# Patient Record
Sex: FEMALE | Race: WHITE | NOT HISPANIC OR LATINO | Employment: UNEMPLOYED | URBAN - METROPOLITAN AREA
[De-identification: names, ages, dates, MRNs, and addresses within clinical notes are randomized per-mention and may not be internally consistent; named-entity substitution may affect disease eponyms.]

---

## 2017-06-22 PROBLEM — M81.0 SENILE OSTEOPOROSIS: Status: ACTIVE | Noted: 2017-06-22

## 2017-06-23 ENCOUNTER — HOSPITAL ENCOUNTER (OUTPATIENT)
Dept: INFUSION THERAPY | Facility: HOSPITAL | Age: 57
Discharge: HOME | End: 2017-06-23

## 2017-07-03 ENCOUNTER — HOSPITAL ENCOUNTER (OUTPATIENT)
Dept: INFUSION THERAPY | Facility: HOSPITAL | Age: 57
Discharge: HOME | End: 2017-07-03
Admitting: INTERNAL MEDICINE

## 2017-07-03 VITALS
WEIGHT: 145 LBS | HEIGHT: 65 IN | RESPIRATION RATE: 16 BRPM | SYSTOLIC BLOOD PRESSURE: 122 MMHG | BODY MASS INDEX: 24.16 KG/M2 | TEMPERATURE: 98.3 F | DIASTOLIC BLOOD PRESSURE: 77 MMHG | OXYGEN SATURATION: 100 % | HEART RATE: 94 BPM

## 2017-07-03 DIAGNOSIS — M81.0 SENILE OSTEOPOROSIS: ICD-10-CM

## 2017-07-03 LAB
ANION GAP SERPL CALCULATED.3IONS-SCNC: 15.1 MMOL/L
BUN BLD-MCNC: 12 MG/DL (ref 6–20)
BUN/CREAT SERPL: 16.4 (ref 7–25)
CALCIUM SPEC-SCNC: 9.8 MG/DL (ref 8.6–10.5)
CHLORIDE SERPL-SCNC: 102 MMOL/L (ref 98–107)
CO2 SERPL-SCNC: 25.9 MMOL/L (ref 22–29)
CREAT BLD-MCNC: 0.73 MG/DL (ref 0.57–1)
GFR SERPL CREATININE-BSD FRML MDRD: 82 ML/MIN/1.73
GLUCOSE BLD-MCNC: 91 MG/DL (ref 65–99)
POTASSIUM BLD-SCNC: 4.2 MMOL/L (ref 3.5–5.2)
SODIUM BLD-SCNC: 143 MMOL/L (ref 136–145)

## 2017-07-03 PROCEDURE — 96401 CHEMO ANTI-NEOPL SQ/IM: CPT

## 2017-07-03 PROCEDURE — 36415 COLL VENOUS BLD VENIPUNCTURE: CPT

## 2017-07-03 PROCEDURE — 25010000002 DENOSUMAB 60 MG/ML SOLUTION: Performed by: INTERNAL MEDICINE

## 2017-07-03 PROCEDURE — 80048 BASIC METABOLIC PNL TOTAL CA: CPT | Performed by: INTERNAL MEDICINE

## 2017-07-03 RX ADMIN — DENOSUMAB 60 MG: 60 INJECTION SUBCUTANEOUS at 11:23

## 2017-07-03 NOTE — PROGRESS NOTES
Calcium level WNL. Patient tolerated injection without complaints. Patient ambulatory, D/C'd from [facility] at 1128.

## 2017-07-03 NOTE — PATIENT INSTRUCTIONS
Denosumab injection  What is this medicine?  DENOSUMAB (den oh yasir mab) slows bone breakdown. Prolia is used to treat osteoporosis in women after menopause and in men. Xgeva is used to prevent bone fractures and other bone problems caused by cancer bone metastases. Xgeva is also used to treat giant cell tumor of the bone.  This medicine may be used for other purposes; ask your health care provider or pharmacist if you have questions.  COMMON BRAND NAME(S): Prolia, XGEVA  What should I tell my health care provider before I take this medicine?  They need to know if you have any of these conditions:  -dental disease  -eczema  -infection or history of infections  -kidney disease or on dialysis  -low blood calcium or vitamin D  -malabsorption syndrome  -scheduled to have surgery or tooth extraction  -taking medicine that contains denosumab  -thyroid or parathyroid disease  -an unusual reaction to denosumab, other medicines, foods, dyes, or preservatives  -pregnant or trying to get pregnant  -breast-feeding  How should I use this medicine?  This medicine is for injection under the skin. It is given by a health care professional in a hospital or clinic setting.  If you are getting Prolia, a special MedGuide will be given to you by the pharmacist with each prescription and refill. Be sure to read this information carefully each time.  For Prolia, talk to your pediatrician regarding the use of this medicine in children. Special care may be needed. For Xgeva, talk to your pediatrician regarding the use of this medicine in children. While this drug may be prescribed for children as young as 13 years for selected conditions, precautions do apply.  Overdosage: If you think you have taken too much of this medicine contact a poison control center or emergency room at once.  NOTE: This medicine is only for you. Do not share this medicine with others.  What if I miss a dose?  It is important not to miss your dose. Call your doctor  or health care professional if you are unable to keep an appointment.  What may interact with this medicine?  Do not take this medicine with any of the following medications:  -other medicines containing denosumab  This medicine may also interact with the following medications:  -medicines that suppress the immune system  -medicines that treat cancer  -steroid medicines like prednisone or cortisone  This list may not describe all possible interactions. Give your health care provider a list of all the medicines, herbs, non-prescription drugs, or dietary supplements you use. Also tell them if you smoke, drink alcohol, or use illegal drugs. Some items may interact with your medicine.  What should I watch for while using this medicine?  Visit your doctor or health care professional for regular checks on your progress. Your doctor or health care professional may order blood tests and other tests to see how you are doing.  Call your doctor or health care professional if you get a cold or other infection while receiving this medicine. Do not treat yourself. This medicine may decrease your body's ability to fight infection.  You should make sure you get enough calcium and vitamin D while you are taking this medicine, unless your doctor tells you not to. Discuss the foods you eat and the vitamins you take with your health care professional.  See your dentist regularly. Brush and floss your teeth as directed. Before you have any dental work done, tell your dentist you are receiving this medicine.  Do not become pregnant while taking this medicine or for 5 months after stopping it. Women should inform their doctor if they wish to become pregnant or think they might be pregnant. There is a potential for serious side effects to an unborn child. Talk to your health care professional or pharmacist for more information.  What side effects may I notice from receiving this medicine?  Side effects that you should report to your doctor  or health care professional as soon as possible:  -allergic reactions like skin rash, itching or hives, swelling of the face, lips, or tongue  -breathing problems  -chest pain  -fast, irregular heartbeat  -feeling faint or lightheaded, falls  -fever, chills, or any other sign of infection  -muscle spasms, tightening, or twitches  -numbness or tingling  -skin blisters or bumps, or is dry, peels, or red  -slow healing or unexplained pain in the mouth or jaw  -unusual bleeding or bruising  Side effects that usually do not require medical attention (report to your doctor or health care professional if they continue or are bothersome):  -muscle pain  -stomach upset, gas  This list may not describe all possible side effects. Call your doctor for medical advice about side effects. You may report side effects to FDA at 9-191-FDA-2241.  Where should I keep my medicine?  This medicine is only given in a clinic, doctor's office, or other health care setting and will not be stored at home.  NOTE: This sheet is a summary. It may not cover all possible information. If you have questions about this medicine, talk to your doctor, pharmacist, or health care provider.     © 2017, [employer]. (2017-01-19 10:06:55)

## 2017-11-14 ENCOUNTER — LAB (OUTPATIENT)
Dept: OTHER | Facility: HOSPITAL | Age: 57
End: 2017-11-14

## 2017-11-14 ENCOUNTER — CONSULT (OUTPATIENT)
Dept: ONCOLOGY | Facility: CLINIC | Age: 57
End: 2017-11-14

## 2017-11-14 VITALS
BODY MASS INDEX: 24.41 KG/M2 | HEIGHT: 64 IN | HEART RATE: 99 BPM | WEIGHT: 143 LBS | RESPIRATION RATE: 18 BRPM | SYSTOLIC BLOOD PRESSURE: 104 MMHG | DIASTOLIC BLOOD PRESSURE: 70 MMHG | TEMPERATURE: 98.4 F | OXYGEN SATURATION: 99 %

## 2017-11-14 DIAGNOSIS — R63.4 WEIGHT LOSS, ABNORMAL: ICD-10-CM

## 2017-11-14 DIAGNOSIS — R10.12 LEFT UPPER QUADRANT PAIN: ICD-10-CM

## 2017-11-14 DIAGNOSIS — R63.0 ANOREXIA: ICD-10-CM

## 2017-11-14 DIAGNOSIS — R61 NIGHT SWEAT: Primary | ICD-10-CM

## 2017-11-14 DIAGNOSIS — R59.1 LYMPHADENOPATHY OF HEAD AND NECK: ICD-10-CM

## 2017-11-14 DIAGNOSIS — D72.820 LYMPHOCYTOSIS: ICD-10-CM

## 2017-11-14 DIAGNOSIS — R89.9 ABNORMAL LABORATORY TEST: Primary | ICD-10-CM

## 2017-11-14 LAB
ALBUMIN SERPL-MCNC: 4.5 G/DL (ref 3.5–5.2)
ALBUMIN/GLOB SERPL: 1.3 G/DL
ALP SERPL-CCNC: 82 U/L (ref 39–117)
ALT SERPL W P-5'-P-CCNC: 13 U/L (ref 1–33)
ANION GAP SERPL CALCULATED.3IONS-SCNC: 16.1 MMOL/L
AST SERPL-CCNC: 22 U/L (ref 1–32)
BASOPHILS # BLD AUTO: 0.07 10*3/MM3 (ref 0–0.2)
BASOPHILS NFR BLD AUTO: 0.5 % (ref 0–1.5)
BILIRUB SERPL-MCNC: 0.2 MG/DL (ref 0.1–1.2)
BUN BLD-MCNC: 11 MG/DL (ref 6–20)
BUN/CREAT SERPL: 12 (ref 7–25)
CALCIUM SPEC-SCNC: 9.7 MG/DL (ref 8.6–10.5)
CHLORIDE SERPL-SCNC: 99 MMOL/L (ref 98–107)
CO2 SERPL-SCNC: 23.9 MMOL/L (ref 22–29)
CREAT BLD-MCNC: 0.92 MG/DL (ref 0.57–1)
CRP SERPL-MCNC: 0.07 MG/DL (ref 0–0.5)
DEPRECATED RDW RBC AUTO: 44 FL (ref 37–54)
EOSINOPHIL # BLD AUTO: 0.13 10*3/MM3 (ref 0–0.7)
EOSINOPHIL NFR BLD AUTO: 0.9 % (ref 0.3–6.2)
ERYTHROCYTE [DISTWIDTH] IN BLOOD BY AUTOMATED COUNT: 13 % (ref 11.7–13)
ERYTHROCYTE [SEDIMENTATION RATE] IN BLOOD: 14 MM/HR (ref 0–30)
GFR SERPL CREATININE-BSD FRML MDRD: 63 ML/MIN/1.73
GLOBULIN UR ELPH-MCNC: 3.5 GM/DL
GLUCOSE BLD-MCNC: 88 MG/DL (ref 65–99)
HCT VFR BLD AUTO: 41.7 % (ref 35.6–45.5)
HGB BLD-MCNC: 14.2 G/DL (ref 11.9–15.5)
IMM GRANULOCYTES # BLD: 0.08 10*3/MM3 (ref 0–0.03)
IMM GRANULOCYTES NFR BLD: 0.6 % (ref 0–0.5)
LDH SERPL-CCNC: 231 U/L (ref 135–214)
LYMPHOCYTES # BLD AUTO: 5.86 10*3/MM3 (ref 0.9–4.8)
LYMPHOCYTES NFR BLD AUTO: 40.8 % (ref 19.6–45.3)
MCH RBC QN AUTO: 31.2 PG (ref 26.9–32)
MCHC RBC AUTO-ENTMCNC: 34.1 G/DL (ref 32.4–36.3)
MCV RBC AUTO: 91.6 FL (ref 80.5–98.2)
MONOCYTES # BLD AUTO: 1.26 10*3/MM3 (ref 0.2–1.2)
MONOCYTES NFR BLD AUTO: 8.8 % (ref 5–12)
NEUTROPHILS # BLD AUTO: 6.98 10*3/MM3 (ref 1.9–8.1)
NEUTROPHILS NFR BLD AUTO: 48.4 % (ref 42.7–76)
NRBC BLD MANUAL-RTO: 0.2 /100 WBC (ref 0–0)
PLATELET # BLD AUTO: 333 10*3/MM3 (ref 140–500)
PMV BLD AUTO: 10 FL (ref 6–12)
POTASSIUM BLD-SCNC: 4.1 MMOL/L (ref 3.5–5.2)
PROT SERPL-MCNC: 8 G/DL (ref 6–8.5)
RBC # BLD AUTO: 4.55 10*6/MM3 (ref 3.9–5.2)
SODIUM BLD-SCNC: 139 MMOL/L (ref 136–145)
WBC NRBC COR # BLD: 14.38 10*3/MM3 (ref 4.5–10.7)

## 2017-11-14 PROCEDURE — 84165 PROTEIN E-PHORESIS SERUM: CPT | Performed by: INTERNAL MEDICINE

## 2017-11-14 PROCEDURE — 86334 IMMUNOFIX E-PHORESIS SERUM: CPT | Performed by: INTERNAL MEDICINE

## 2017-11-14 PROCEDURE — 80053 COMPREHEN METABOLIC PANEL: CPT | Performed by: INTERNAL MEDICINE

## 2017-11-14 PROCEDURE — 85025 COMPLETE CBC W/AUTO DIFF WBC: CPT | Performed by: INTERNAL MEDICINE

## 2017-11-14 PROCEDURE — 83883 ASSAY NEPHELOMETRY NOT SPEC: CPT | Performed by: INTERNAL MEDICINE

## 2017-11-14 PROCEDURE — 83615 LACTATE (LD) (LDH) ENZYME: CPT | Performed by: INTERNAL MEDICINE

## 2017-11-14 PROCEDURE — 36415 COLL VENOUS BLD VENIPUNCTURE: CPT

## 2017-11-14 PROCEDURE — 86140 C-REACTIVE PROTEIN: CPT | Performed by: INTERNAL MEDICINE

## 2017-11-14 PROCEDURE — 99204 OFFICE O/P NEW MOD 45 MIN: CPT | Performed by: INTERNAL MEDICINE

## 2017-11-14 PROCEDURE — 85651 RBC SED RATE NONAUTOMATED: CPT | Performed by: INTERNAL MEDICINE

## 2017-11-14 RX ORDER — FLUTICASONE PROPIONATE 50 MCG
2 SPRAY, SUSPENSION (ML) NASAL DAILY
COMMUNITY

## 2017-11-14 RX ORDER — ATORVASTATIN CALCIUM 10 MG/1
10 TABLET, FILM COATED ORAL NIGHTLY
COMMUNITY

## 2017-11-14 RX ORDER — ALBUTEROL SULFATE 90 UG/1
2 AEROSOL, METERED RESPIRATORY (INHALATION) EVERY 4 HOURS PRN
COMMUNITY

## 2017-11-14 NOTE — PROGRESS NOTES
Subjective     REASON FOR CONSULTATION:  LYMPHOCYTOSIS, LEUKOCYTOSIS, ANOREXIA, WEIGHT  LOSS 30 LB IN 3 MONTHS, NOCUTURNAL SWEATS, ABDOMINAL PAIN  Provide an opinion on any further workup or treatment                             REQUESTING PHYSICIAN:  DR CLARA ANN PALLARES MD    RECORDS OBTAINED:  Records of the patients history including those obtained from the referring provider were reviewed and summarized in detail.    HISTORY OF PRESENT ILLNESS:  The patient is a 57 y.o. year old female who is here for an opinion about the above issue.    History of Present Illness This 57-year-old white female has been referred to us in order to review history of lymphocytosis that has been ongoing at least for 1-1/2 years. She has fluctuating white count in the 14,000, 25839 and then down to 10,000. She has had a normal hemoglobin and a normal platelet count and persistent lymphocytosis. On top of this the patient during the last 3 months has developed significant anorexia, losing 30 pounds of weight and having nocturnal sweats to the point that she has to get out of the bed and change her pajamas. She takes Tylenol t.i.d. She is not sure if she has had any fever or not. The patient feels very fatigued and tired to the point that she needs to lie down in bed and take a nap at 3 times a week. This is new to her. Besides this the patient has been experiencing intermittently a pain in the left upper quadrant of her abdomen, intensity 6 out of 10, propagated to the umbilical area, not associated with defecation or urination, not triggered by nausea, vomiting and no other concomitant. The pain comes and goes; sometimes wakes her up in the middle of the night and is not relieved with anything besides Tylenol.     The patient denies any palpable adenopathy at any level. She complains of a macular rash that comes and goes in the forearms intermittently. She has also an element of pruritus. She has not noticed any other alteration  in the skin. She denies any headache, blurred vision, alterations in the nasal passages or oral mucosa. She has no difficulty swallowing. She has chronic hacking cough associated with mucoid sputum production and related to chronic smoking. She has shortness of breath upon exercise due to COPD. No paroxysmal nocturnal dyspnea. No orthopnea. She has not had any hemoptysis. She has not had any difficulty swallowing. No nausea, no vomiting, but occasional nausea associated after her hiatal hernia surgery. The patient's bowel activity remains normal. She has no obvious abdominal distention. She has not had passage of blood in the stool and she has not developed any jaundice. She has not had any urinary symptomatology, vaginal bleeding or vaginal discharge. She has aches and pains in the neck and multiple other sites that are chronic in nature, related to previous trauma and osteoarthritis. These pains have not changed. She has not developed any other areas of acute pain besides what I stated in her abdomen. The patient denies any other rashes. She has a headache, dull in nature, comes and goes with no specific concomitant factor.  She denies any photophobia. Again no fevers, no chills.        Past Medical History:   Diagnosis Date   • COPD (chronic obstructive pulmonary disease)    • GERD (gastroesophageal reflux disease)    • H/O Anesthesia complication     Has awakened during scope procedures   • H/O B12 deficiency    • H/O Diverticulosis    • H/O Neck pain    • H/O PONV (postoperative nausea and vomiting)     Does well with scope patch   • H/O Syncope and collapse 2013    Cardiac workup w/o cardiac disease diagnosed   • Hiatal hernia    • IBS (irritable bowel syndrome)    • Osteoporosis    • Pulmonary embolism 1986    Post hysterectomy        Past Surgical History:   Procedure Laterality Date   • APPENDECTOMY     • CHOLECYSTECTOMY     • HERNIA REPAIR     • HYSTERECTOMY  1986    partial   • NECK SURGERY     •  OOPHORECTOMY Right    • SHOULDER SURGERY Right 2015    REMOVE BONE SPURS   • TONSILLECTOMY          Current Outpatient Prescriptions on File Prior to Visit   Medication Sig Dispense Refill   • acetaminophen (TYLENOL) 500 MG tablet Take 500 mg by mouth 2 (Two) Times a Day. 2,  bid     • cyanocobalamin 1000 MCG/ML injection Inject 1,000 mcg into the shoulder, thigh, or buttocks Every 30 (Thirty) Days.     • denosumab (PROLIA) 60 MG/ML solution syringe Inject  under the skin Every 6 (Six) Months.     • furosemide (LASIX) 20 MG tablet Take 20 mg by mouth daily.     • gabapentin (NEURONTIN) 300 MG capsule Take 600 mg by mouth 3 (Three) Times a Day.     • hydrOXYzine (ATARAX) 25 MG tablet Take 50 mg by mouth every night.     • [DISCONTINUED] albuterol (PROVENTIL HFA;VENTOLIN HFA) 108 (90 BASE) MCG/ACT inhaler Inhale 2 puffs as needed for wheezing.     • [DISCONTINUED] aspirin 81 MG tablet Take 81 mg by mouth Daily.       No current facility-administered medications on file prior to visit.         ALLERGIES:    Allergies   Allergen Reactions   • Codeine    • Ibuprofen         Social History     Social History   • Marital status:      Spouse name: Sunday   • Number of children: N/A   • Years of education: N/A     Occupational History   •  Unemployed     Social History Main Topics   • Smoking status: Current Every Day Smoker     Packs/day: 0.50     Types: Cigarettes   • Smokeless tobacco: None      Comment: 35 years     1/2 ppd   • Alcohol use No   • Drug use: No   • Sexual activity: Defer     Other Topics Concern   • None     Social History Narrative        Family History   Problem Relation Age of Onset   • Hypertension Mother    • Throat cancer Father      Voice box   • Heart attack Father    • Heart disease Father    • Colon polyps Sister    • Colon cancer Paternal Aunt         Review of Systems   General: no fever, chills,STATED fatigue,AND weight changes, AND lack of appetite.  Eyes: no epiphora,  "xerophthalmia,conjunctivitis, pain, glaucoma, blurred vision, blindness, secretion, photophobia, proptosis, diplopia.  Ears: no otorrhea, tinnitus, otorrhagia, deafness, pain, vertigo.  Nose: no rhinorrhea, epistaxis, alteration in perception of odors, sinuses pressure.  Mouth: no alteration in gums or teeth,  ulcers, no difficulty with mastication or deglut ion, no odynophagia.  Neck: no masses or pain, no thyroid alterations, no pain in muscles or arteries, no carotid odynia, no crepitation.  Respiratory: STATED cough,CLEAR sputum production, dyspnea,NO trepopnea, pleuritic pain, hemoptysis.  Heart: no syncope, irregularity, palpitations, angina, orthopnea, paroxysmal nocturnal dyspnea.  Vascular Venous: no tenderness,edema, palpable cords, postphlebitic syndrome, skin changes or ulcerations.  Vascular Arterial: no distal ischemia, claudication, gangrene, neuropathic ischemic pain, skin ulcers, paleness or cyanosis.  GI: no dysphagia, odynophagia, no regurgitation, no heartburn,no indigestion,no nausea,no vomiting,no hematemesis ,no melena,no jaundice,no distention, no obstipation,no enterorrhagia,no proctalgia,no anal  lesions, nochanges in bowel habits.STATED ABDOMINAL PAIN.  : no frequency, hesitancy, hematuria, discharge, pain.  Musculoskeletal: STATED muscle or tendon pain or inflammation, joint pain, edema, functional limitation, fasciculations, mass.  Neurologic: no headache, seizures, alterations on Craneal nerves, no motor or senssory deficit, normal coordination, no alteration in memory, orientation, calculation,writting, verbal or written language.  Skin: STATED rashes, AND pruritus NO localized lesions.  Psychiatric: no anxiety, depression, agitation, delusions, proper insight.    Objective     Vitals:    11/14/17 1412   BP: 104/70   Pulse: 99   Resp: 18   Temp: 98.4 °F (36.9 °C)   TempSrc: Oral   SpO2: 99%   Weight: 143 lb (64.9 kg)   Height: 64.17\" (163 cm)   PainSc: 6  Comment: generalized joint " muslce pain   PainLoc: Generalized     Current Status 11/14/2017   ECOG score 0       Physical Exam    GENERAL:  Well-developed, well-nourished  Patient  in no acute distress. LOOKING OLDER FOR HER CHRONOLOGICAL AGE.  SKIN:  Warm, dry without rashes, purpura or petechiae.  HEENT:  Pupils were equal and reactive to light and accomodation, conjunctivas non injected, no pterigion, normal extraocular movements, normal visual acuity. BILATERAL ARCUS SENILIS AND CATARACTS.  Mouth mucosa was moist, no exudates in oropharynx, normal gum line, normal roof of the mouth and pillars, normal papillations of the tongue.Ear canals were normal, as well tympanic membranes, normal hearing acuity.No pain in mastoid area or erythema.  NECK:  Supple with good range of motion; no thyromegaly or masses, no JVD or bruits, 1 CM BILATERAL JUGULODIGASTRIC ADENOPATHIES MOBILE NON TENDER RUBBERY.NO OTHER cervical adenopathies.No carotid arteries pain, no carotid abnormal pulsation or arterial dance.SCAR FROM PREVIOUS NECK SURGERY  LYMPHATICS:  No supraclavicular, axillary, epitrochlear or inguinal adenopathy.  CHEST:  Normal excursion of both randy thoraces, normal voice fremitus, no subcutaneous emphysema, normal axillas, no rashes or acanthosis nigricans. Lungs clear to percussion and auscultation, normal breath sounds bilaterally, no wheezing, crackles or ronchi, no stridor, no rubs.  CARDIAC AND VASCULAR: PMI not displaced,no thrills, normal rate and regular rhythm, without murmurs, rubs or S3 or S4 right or left sided gallops. Normal femoral, popliteal, pedis, brachial and carotid pulses.  ABDOMEN:  Soft, LUQ tender, NO REBOUND with no organomegaly or masses, no ascites, no collateral circulation,no distention,no Mermentau sign,  no inguinal hernias,no umbilical hernias, no abdominal bruits. Normal bowel sounds.  GENITAL: Not  Performed.  EXTREMITIES  AND SPINE:  No clubbing, cyanosis or edema, no deformities or pain .No kyphosis, scoliosis,  deformities or pain in spine, ribs or pelvic bone.PAIN IN NECK AREA AND LOWER LUMBAR SPINE  NEUROLOGICAL:  Patient was awake, alert, oriented to time, person and place,normal gait and coordination  RECENT LABS:  Hematology WBC   Date Value Ref Range Status   2017 14.38 (H) 4.50 - 10.70 10*3/mm3 Final     RBC   Date Value Ref Range Status   2017 4.55 3.90 - 5.20 10*6/mm3 Final     Hemoglobin   Date Value Ref Range Status   2017 14.2 11.9 - 15.5 g/dL Final     Hematocrit   Date Value Ref Range Status   2017 41.7 35.6 - 45.5 % Final     Platelets   Date Value Ref Range Status   2017 333 140 - 500 10*3/mm3 Final            Assessment/Plan  In summary I have a 57-year-old white female who is a chronic smoker who has been on disability due to chronic back pain, cervical spine pain, who has been referred to us because she has had a leukocytosis at least since 2016 and progressive lymphocytosis. During the last 2 months the patient has started to complain of pain in the left upper quadrant of her abdomen intermittently on daily basis. She has lost her appetite and she has lost 30 pounds of weight. She has nocturnal sweats and significant degree of fatigue to the point that she needs to take a nap through the day and this is not her. Her physical exam is unrevealing with the exception of palpable cervical adenopathies stated in the physical examination and persistent lymphocytosis and leukocytosis. The abdominal exam is benign, at least no palpable organomegaly or masses were documented. The patient mentions a rash in the forearms but she has none today though. She is a chronic smoker. All this concerns me in regard to the possibility of occult malignancy of some sort or also the possibility of a lymphatic malignancy like some sort of leukemia or lymphoma. For this reason I am going to advise the patient the followin. I have not prescribed or modified any of her medicines.   2. The  patient will proceed with a chemistry profile, LDH, C-reactive protein, serum protein immunoelectrophoresis and peripheral blood flow cytometry to further try to identify the lymphocytes.   3. The patient will have peripheral blood examination by me.   4. She will proceed with a CT scan of the neck, chest, abdomen and pelvis in a few days to further investigate the cervical adenopathies and the sweats, fatigue, the weight loss, and also the pain in the left lower quadrant of her abdomen.     After we do all this testing, the patient will return to see me, put all this information together and then decide how to proceed at that point. She was ready to proceed. We spent 1 hour of our time together, going through the review of all these issues and going through examination and final conclusions. She was ready to proceed.

## 2017-11-16 ENCOUNTER — HOSPITAL ENCOUNTER (OUTPATIENT)
Dept: CT IMAGING | Facility: HOSPITAL | Age: 57
Discharge: HOME | End: 2017-11-16
Attending: INTERNAL MEDICINE | Admitting: INTERNAL MEDICINE

## 2017-11-16 DIAGNOSIS — R63.4 WEIGHT LOSS, ABNORMAL: ICD-10-CM

## 2017-11-16 DIAGNOSIS — R10.12 LEFT UPPER QUADRANT PAIN: ICD-10-CM

## 2017-11-16 DIAGNOSIS — R61 NIGHT SWEAT: ICD-10-CM

## 2017-11-16 DIAGNOSIS — R63.0 ANOREXIA: ICD-10-CM

## 2017-11-16 DIAGNOSIS — D72.820 LYMPHOCYTOSIS: ICD-10-CM

## 2017-11-16 DIAGNOSIS — R59.1 LYMPHADENOPATHY OF HEAD AND NECK: ICD-10-CM

## 2017-11-16 LAB
ALBUMIN SERPL-MCNC: 4 G/DL (ref 2.9–4.4)
ALBUMIN/GLOB SERPL: 1.2 {RATIO} (ref 0.7–1.7)
ALPHA1 GLOB FLD ELPH-MCNC: 0.3 G/DL (ref 0–0.4)
ALPHA2 GLOB SERPL ELPH-MCNC: 0.9 G/DL (ref 0.4–1)
B-GLOBULIN SERPL ELPH-MCNC: 1.4 G/DL (ref 0.7–1.3)
GAMMA GLOB SERPL ELPH-MCNC: 1 G/DL (ref 0.4–1.8)
GLOBULIN SER CALC-MCNC: 3.6 G/DL (ref 2.2–3.9)
IGA SERPL-MCNC: 336 MG/DL (ref 87–352)
IGG SERPL-MCNC: 842 MG/DL (ref 700–1600)
IGM SERPL-MCNC: 140 MG/DL (ref 26–217)
INTERPRETATION SERPL IEP-IMP: ABNORMAL
KAPPA LC SERPL-MCNC: 29.7 MG/L (ref 3.3–19.4)
KAPPA LC/LAMBDA SER: 1.63 {RATIO} (ref 0.26–1.65)
LAMBDA LC FREE SERPL-MCNC: 18.2 MG/L (ref 5.7–26.3)
Lab: ABNORMAL
M-SPIKE: ABNORMAL G/DL
PROT SERPL-MCNC: 7.6 G/DL (ref 6–8.5)
REF LAB TEST METHOD: NORMAL

## 2017-11-16 PROCEDURE — 74177 CT ABD & PELVIS W/CONTRAST: CPT

## 2017-11-16 PROCEDURE — 71260 CT THORAX DX C+: CPT

## 2017-11-16 PROCEDURE — 82565 ASSAY OF CREATININE: CPT

## 2017-11-16 PROCEDURE — 0 IOPAMIDOL 61 % SOLUTION: Performed by: INTERNAL MEDICINE

## 2017-11-16 PROCEDURE — 70491 CT SOFT TISSUE NECK W/DYE: CPT

## 2017-11-16 PROCEDURE — 0 DIATRIZOATE MEGLUMINE & SODIUM PER 1 ML: Performed by: INTERNAL MEDICINE

## 2017-11-16 RX ADMIN — IOPAMIDOL 90 ML: 612 INJECTION, SOLUTION INTRAVENOUS at 13:10

## 2017-11-16 RX ADMIN — DIATRIZOATE MEGLUMINE AND DIATRIZOATE SODIUM 30 ML: 660; 100 LIQUID ORAL; RECTAL at 12:10

## 2017-11-17 LAB — CREAT BLDA-MCNC: 0.7 MG/DL (ref 0.6–1.3)

## 2017-12-05 ENCOUNTER — OFFICE VISIT (OUTPATIENT)
Dept: ONCOLOGY | Facility: CLINIC | Age: 57
End: 2017-12-05

## 2017-12-05 ENCOUNTER — LAB (OUTPATIENT)
Dept: OTHER | Facility: HOSPITAL | Age: 57
End: 2017-12-05

## 2017-12-05 VITALS
SYSTOLIC BLOOD PRESSURE: 105 MMHG | DIASTOLIC BLOOD PRESSURE: 67 MMHG | OXYGEN SATURATION: 98 % | HEART RATE: 93 BPM | BODY MASS INDEX: 24.75 KG/M2 | WEIGHT: 145 LBS | HEIGHT: 64 IN | RESPIRATION RATE: 16 BRPM | TEMPERATURE: 98 F

## 2017-12-05 DIAGNOSIS — D72.820 LYMPHOCYTOSIS: Primary | ICD-10-CM

## 2017-12-05 DIAGNOSIS — R63.4 WEIGHT LOSS, ABNORMAL: ICD-10-CM

## 2017-12-05 PROBLEM — K20.90 ESOPHAGITIS: Status: ACTIVE | Noted: 2017-12-05

## 2017-12-05 LAB
BASOPHILS # BLD AUTO: 0.09 10*3/MM3 (ref 0–0.2)
BASOPHILS NFR BLD AUTO: 0.8 % (ref 0–1.5)
DEPRECATED RDW RBC AUTO: 43.8 FL (ref 37–54)
EOSINOPHIL # BLD AUTO: 0.14 10*3/MM3 (ref 0–0.7)
EOSINOPHIL NFR BLD AUTO: 1.3 % (ref 0.3–6.2)
ERYTHROCYTE [DISTWIDTH] IN BLOOD BY AUTOMATED COUNT: 13.2 % (ref 11.7–13)
HCT VFR BLD AUTO: 37.2 % (ref 35.6–45.5)
HGB BLD-MCNC: 12.6 G/DL (ref 11.9–15.5)
IMM GRANULOCYTES # BLD: 0.02 10*3/MM3 (ref 0–0.03)
IMM GRANULOCYTES NFR BLD: 0.2 % (ref 0–0.5)
LYMPHOCYTES # BLD AUTO: 5.79 10*3/MM3 (ref 0.9–4.8)
LYMPHOCYTES NFR BLD AUTO: 52.6 % (ref 19.6–45.3)
MCH RBC QN AUTO: 31.1 PG (ref 26.9–32)
MCHC RBC AUTO-ENTMCNC: 33.9 G/DL (ref 32.4–36.3)
MCV RBC AUTO: 91.9 FL (ref 80.5–98.2)
MONOCYTES # BLD AUTO: 1 10*3/MM3 (ref 0.2–1.2)
MONOCYTES NFR BLD AUTO: 9.1 % (ref 5–12)
NEUTROPHILS # BLD AUTO: 3.97 10*3/MM3 (ref 1.9–8.1)
NEUTROPHILS NFR BLD AUTO: 36 % (ref 42.7–76)
NRBC BLD MANUAL-RTO: 0 /100 WBC (ref 0–0)
PLATELET # BLD AUTO: 273 10*3/MM3 (ref 140–500)
PMV BLD AUTO: 10.2 FL (ref 6–12)
RBC # BLD AUTO: 4.05 10*6/MM3 (ref 3.9–5.2)
WBC NRBC COR # BLD: 11.01 10*3/MM3 (ref 4.5–10.7)

## 2017-12-05 PROCEDURE — 99214 OFFICE O/P EST MOD 30 MIN: CPT | Performed by: INTERNAL MEDICINE

## 2017-12-05 PROCEDURE — 85025 COMPLETE CBC W/AUTO DIFF WBC: CPT | Performed by: INTERNAL MEDICINE

## 2017-12-05 PROCEDURE — 36415 COLL VENOUS BLD VENIPUNCTURE: CPT

## 2017-12-05 NOTE — PROGRESS NOTES
Subjective     REASON FOR FOLLOW UP:  LYMPHOCYTOSIS, LEUKOCYTOSIS, ANOREXIA, WEIGHT  LOSS 30 LB IN 3 MONTHS, NOCUTURNAL SWEATS, ABDOMINAL PAIN                               RHISTORY OF PRESENT ILLNESS:  The patient is a 57 y.o. year old female who is here for an opinion about the above issue.    History of Present Illness This 57-year-old white female has been referred to us in order to review history of lymphocytosis that has been ongoing at least for 1-1/2 years. She has fluctuating white count in the 14,000, 76209 and then down to 10,000. She has had a normal hemoglobin and a normal platelet count and persistent lymphocytosis. On top of this the patient during the last 3 months has developed significant anorexia, losing 30 pounds of weight and having nocturnal sweats to the point that she has to get out of the bed and change her pajamas. She takes Tylenol t.i.d. She is not sure if she has had any fever or not. The patient feels very fatigued and tired to the point that she needs to lie down in bed and take a nap at 3 times a week. This is new to her. Besides this the patient has been experiencing intermittently a pain in the left upper quadrant of her abdomen, intensity 6 out of 10, propagated to the umbilical area, not associated with defecation or urination, not triggered by nausea, vomiting and no other concomitant. The pain comes and goes; sometimes wakes her up in the middle of the night and is not relieved with anything besides Tylenol.     The patient denies any palpable adenopathy at any level. She complains of a macular rash that comes and goes in the forearms intermittently. She has also an element of pruritus. She has not noticed any other alteration in the skin. She denies any headache, blurred vision, alterations in the nasal passages or oral mucosa. She has no difficulty swallowing. She has chronic hacking cough associated with mucoid sputum production and related to chronic smoking. She has  shortness of breath upon exercise due to COPD. No paroxysmal nocturnal dyspnea. No orthopnea. She has not had any hemoptysis. She has not had any difficulty swallowing. No nausea, no vomiting, but occasional nausea associated after her hiatal hernia surgery. The patient's bowel activity remains normal. She has no obvious abdominal distention. She has not had passage of blood in the stool and she has not developed any jaundice. She has not had any urinary symptomatology, vaginal bleeding or vaginal discharge. She has aches and pains in the neck and multiple other sites that are chronic in nature, related to previous trauma and osteoarthritis. These pains have not changed. She has not developed any other areas of acute pain besides what I stated in her abdomen. The patient denies any other rashes. She has a headache, dull in nature, comes and goes with no specific concomitant factor.  She denies any photophobia. Again no fevers, no chills.  The patient was reviewed on 12/05/2017 still with similar symptomatology to the one described in the original consultation but not experiencing any fevers or chills. She was experiencing some discomfort throughout her abdomen but no abdominal distention or modification in bowel habits and neither passage of blood in the stool. She was not having any significant dysphagia. Please review in regard to the analysis done and the recommendations.          Past Medical History:   Diagnosis Date   • COPD (chronic obstructive pulmonary disease)    • GERD (gastroesophageal reflux disease)    • H/O Anesthesia complication     Has awakened during scope procedures   • H/O B12 deficiency    • H/O Diverticulosis    • H/O Neck pain    • H/O PONV (postoperative nausea and vomiting)     Does well with scope patch   • H/O Syncope and collapse 2013    Cardiac workup w/o cardiac disease diagnosed   • Hiatal hernia    • IBS (irritable bowel syndrome)    • Osteoporosis    • Pulmonary embolism 1986     Post hysterectomy        Past Surgical History:   Procedure Laterality Date   • APPENDECTOMY  1982   • CHOLECYSTECTOMY  1982   • HERNIA REPAIR  2016    Reflux and hiatal   • HYSTERECTOMY  1986    partial   • NECK SURGERY  2012    Plate, C5-C8   • OOPHORECTOMY Right 1990   • SHOULDER SURGERY Right 2015    REMOVE BONE SPURS   • TONSILLECTOMY     • TUMOR REMOVAL Right 2014    Lower back, fatty        Current Outpatient Prescriptions on File Prior to Visit   Medication Sig Dispense Refill   • acetaminophen (TYLENOL) 500 MG tablet Take 500 mg by mouth 2 (Two) Times a Day. 2,  bid     • albuterol (PROVENTIL HFA;VENTOLIN HFA) 108 (90 Base) MCG/ACT inhaler Inhale 2 puffs Every 4 (Four) Hours As Needed for Wheezing.     • atorvastatin (LIPITOR) 10 MG tablet Take 10 mg by mouth Every Night.     • cyanocobalamin 1000 MCG/ML injection Inject 1,000 mcg into the shoulder, thigh, or buttocks Every 30 (Thirty) Days.     • denosumab (PROLIA) 60 MG/ML solution syringe Inject  under the skin Every 6 (Six) Months.     • fluticasone (FLONASE) 50 MCG/ACT nasal spray 2 sprays into each nostril Daily. 50mcg    2, 2x day     • furosemide (LASIX) 20 MG tablet Take 20 mg by mouth daily.     • gabapentin (NEURONTIN) 300 MG capsule Take 600 mg by mouth 3 (Three) Times a Day.     • hydrOXYzine (ATARAX) 25 MG tablet Take 50 mg by mouth every night.       No current facility-administered medications on file prior to visit.         ALLERGIES:    Allergies   Allergen Reactions   • Codeine    • Ibuprofen         Social History     Social History   • Marital status:      Spouse name: Sunday   • Number of children: 1   • Years of education: High School     Occupational History   •  Unemployed     Social History Main Topics   • Smoking status: Current Every Day Smoker     Packs/day: 0.50     Types: Cigarettes   • Smokeless tobacco: None      Comment: 35 years     1/2 ppd   • Alcohol use No   • Drug use: No   • Sexual activity: Defer     Other  Topics Concern   • None     Social History Narrative        Family History   Problem Relation Age of Onset   • Hypertension Mother    • Heart disease Mother    • Throat cancer Father 52     Voice box   • Heart attack Father    • Heart disease Father    • Colon polyps Sister    • Colon cancer Paternal Aunt         Review of Systems   General: no fever, chills,STATED fatigue,AND weight changes, AND lack of appetite.  Eyes: no epiphora, xerophthalmia,conjunctivitis, pain, glaucoma, blurred vision, blindness, secretion, photophobia, proptosis, diplopia.  Ears: no otorrhea, tinnitus, otorrhagia, deafness, pain, vertigo.  Nose: no rhinorrhea, epistaxis, alteration in perception of odors, sinuses pressure.  Mouth: no alteration in gums or teeth,  ulcers, no difficulty with mastication or deglut ion, no odynophagia.  Neck: no masses or pain, no thyroid alterations, no pain in muscles or arteries, no carotid odynia, no crepitation.  Respiratory: STATED cough,CLEAR sputum production, dyspnea,NO trepopnea, pleuritic pain, hemoptysis.  Heart: no syncope, irregularity, palpitations, angina, orthopnea, paroxysmal nocturnal dyspnea.  Vascular Venous: no tenderness,edema, palpable cords, postphlebitic syndrome, skin changes or ulcerations.  Vascular Arterial: no distal ischemia, claudication, gangrene, neuropathic ischemic pain, skin ulcers, paleness or cyanosis.  GI: no dysphagia, odynophagia, no regurgitation, no heartburn,no indigestion,no nausea,no vomiting,no hematemesis ,no melena,no jaundice,no distention, no obstipation,no enterorrhagia,no proctalgia,no anal  lesions, nochanges in bowel habits.STATED ABDOMINAL PAIN.  : no frequency, hesitancy, hematuria, discharge, pain.  Musculoskeletal: STATED muscle or tendon pain or inflammation, joint pain, edema, functional limitation, fasciculations, mass.  Neurologic: no headache, seizures, alterations on Craneal nerves, no motor or senssory deficit, normal coordination, no  "alteration in memory, orientation, calculation,writting, verbal or written language.  Skin: STATED rashes, AND pruritus NO localized lesions.  Psychiatric: no anxiety, depression, agitation, delusions, proper insight.    Objective     Vitals:    12/05/17 1235   BP: 105/67   Pulse: 93   Resp: 16   Temp: 98 °F (36.7 °C)   TempSrc: Oral   SpO2: 98%   Weight: 65.8 kg (145 lb)   Height: 163 cm (64.17\")   PainSc:   6   PainLoc: Generalized     Current Status 12/5/2017   ECOG score 0       Physical Exam    GENERAL:  Well-developed, well-nourished  Patient  in no acute distress. LOOKING OLDER FOR HER CHRONOLOGICAL AGE.  SKIN:  Warm, dry without rashes, purpura or petechiae.  HEENT:  Pupils were equal and reactive to light and accomodation, conjunctivas non injected, no pterigion, normal extraocular movements, normal visual acuity. BILATERAL ARCUS SENILIS AND CATARACTS.  Mouth mucosa was moist, no exudates in oropharynx, normal gum line, normal roof of the mouth and pillars, normal papillations of the tongue.Ear canals were normal, as well tympanic membranes, normal hearing acuity.No pain in mastoid area or erythema.  NECK:  Supple with good range of motion; no thyromegaly or masses, no JVD or bruits, 1 CM BILATERAL JUGULODIGASTRIC ADENOPATHIES MOBILE NON TENDER RUBBERY.NO OTHER cervical adenopathies.No carotid arteries pain, no carotid abnormal pulsation or arterial dance.SCAR FROM PREVIOUS NECK SURGERY  LYMPHATICS:  No supraclavicular, axillary, epitrochlear or inguinal adenopathy.  CHEST:  Normal excursion of both randy thoraces, normal voice fremitus, no subcutaneous emphysema, normal axillas, no rashes or acanthosis nigricans. Lungs clear to percussion and auscultation, normal breath sounds bilaterally, no wheezing, crackles or ronchi, no stridor, no rubs.  CARDIAC AND VASCULAR: PMI not displaced,no thrills, normal rate and regular rhythm, without murmurs, rubs or S3 or S4 right or left sided gallops. Normal femoral, " popliteal, pedis, brachial and carotid pulses.  ABDOMEN:  Soft, LUQ tender, NO REBOUND with no organomegaly or masses, no ascites, no collateral circulation,no distention,no Covert sign,  no inguinal hernias,no umbilical hernias, no abdominal bruits. Normal bowel sounds.  GENITAL: Not  Performed.  EXTREMITIES  AND SPINE:  No clubbing, cyanosis or edema, no deformities or pain .No kyphosis, scoliosis, deformities or pain in spine, ribs or pelvic bone.PAIN IN NECK AREA AND LOWER LUMBAR SPINE  NEUROLOGICAL:  Patient was awake, alert, oriented to time, person and place,normal gait and coordination  RECENT LABS:  Hematology WBC   Date Value Ref Range Status   12/05/2017 11.01 (H) 4.50 - 10.70 10*3/mm3 Final     RBC   Date Value Ref Range Status   12/05/2017 4.05 3.90 - 5.20 10*6/mm3 Final     Hemoglobin   Date Value Ref Range Status   12/05/2017 12.6 11.9 - 15.5 g/dL Final     Hematocrit   Date Value Ref Range Status   12/05/2017 37.2 35.6 - 45.5 % Final     Platelets   Date Value Ref Range Status   12/05/2017 273 140 - 500 10*3/mm3 Final        CT NECK, CHEST, ABDOMEN AND PELVIS WITH IV CONTRAST      HISTORY: 57-year-old female with lymphocytosis, night sweats, fatigue.  Anorexia and weight loss. Previous cholecystectomy, hysterectomy, right  oophorectomy, appendectomy.      TECHNIQUE: CT neck, chest, abdomen and pelvis with IV and oral contrast.      COMPARISON: CT abdomen and pelvis with IV contrast 02/19/2007. There are  no previous neck or chest CTs for comparison.      FINDINGS  NECK: Mastoid air cells and visualized paranasal sinuses appear clear.  The parotid glands, submandibular glands, thyroid gland appear within  normal limits. There are small anterior and posterior cervical chain  lymph nodes without brayden enlargement within the neck. Oropharyngeal,  hypopharyngeal and parapharyngeal soft tissues appear within normal  limits. There has been previous anterior cervical fusion with placement  of plate and  screws and interbody spacers at C5-C6-C7 where there is  solid osseous fusion.      CHEST: Within the anterior mediastinum just anterior to the left  brachiocephalic vein there is a lymph node measuring 14 x 7 mm. There  are no additional enlarged mediastinal nodes. Small bilateral axillary  nodes contain fatty olga and are not enlarged. There are scattered  bilateral pulmonary calcified granulomas. There is no suspicious  pulmonary nodule or infiltrate or pleural effusion. Bone windows  demonstrate an hemangioma within the T7 vertebral body.      ABDOMEN/PELVIS: There appears to have been previous gastric surgery with  Nissen fundoplication. There is mild wall thickening involving the  distal esophagus. Hepatic size is borderline enlarged. There has been  previous cholecystectomy. The splenic size is normal. There is  malrotation of the left kidney and there is variant venous anatomy of  the left kidney. The left renal vein draining the upper pole is  retroaortic. There is a left mid to lower pole renal vein that drains  into the left common iliac vein. Bowel loops appear within normal  limits. There is no ascites. There has been previous hysterectomy,  cholecystectomy, appendectomy. There is no brayden enlargement within the  abdomen or pelvis. There is a hemangioma within the left aspect of the  L4 vertebral body.      IMPRESSION:  1. Mildly enlarged anterior mediastinal lymph node just anterior to the  left brachycephalic vein. Follow-up CT could be performed to verify  stability of size. There is no further evidence for brayden enlargement  within the neck, chest, abdomen or pelvis.  2. Generalized wall thickening of the distal esophagus. This is  nonspecific though may be seen with esophagitis and could be further  evaluated endoscopically. There appears to have been previous gastric  surgery with Nissen fundoplication.  3. Borderline hepatomegaly.  4. Previous cholecystectomy, hysterectomy, appendectomy.  5.  Congenital variant left kidney with venous drainage from the left mid  to lower pole extending to the left common iliac vein.      Radiation dose reduction techniques were utilized, including automated  exposure control and exposure modulation based on body size.      This report was finalized on 11/17/2017 2:19 PM by Dr. Kristopher Camara MD.  [facility]ARA, PE & Free LT Chains, Ser   Order: [id number]   Status:  Final result   Visible to patient:  No (Not Released) Dx:  Night sweat; Anorexia; Lymphocytosis;...      Ref Range & Units 3wk ago     IgG 700 - 1600 mg/dL 842   IgA 87 - 352 mg/dL 336   IgM 26 - 217 mg/dL 140   Total Protein 6.0 - 8.5 g/dL 7.6   Albumin 2.9 - 4.4 g/dL 4.0   Alpha-1-Globulin 0.0 - 0.4 g/dL 0.3   Alpha-2-Globulin 0.4 - 1.0 g/dL 0.9   Beta Globulin 0.7 - 1.3 g/dL 1.4 (H)   Gamma Globulin 0.4 - 1.8 g/dL 1.0   M-[place] Not Observed g/dL Not Observed   Globulin 2.2 - 3.9 g/dL 3.6   A/G Ratio 0.7 - 1.7 1.2   Immunofixation Reflex, Serum  Comment   Comments: No monoclonality detected.   Please note  Comment   Comments: Protein electrophoresis scan will follow via computer, mail, or    delivery.   Free Light Chain, Kappa 3.3 - 19.4 mg/L 29.7 (H)   Free Lambda Light Chains 5.7 - 26.3 mg/L 18.2           C-reactive Protein   Order: [id number]   Status:  Final result   Visible to patient:  No (Not Released) Dx:  Night sweat; Anorexia; Lymphocytosis;...      Ref Range & Units 3wk ago     C-Reactive Protein 0.00 - 0.50 mg/dL 0.07   Resulting Agency  [facility]      Specimen Collected: 11/14/17  3:29 PM Last Resulted: 11/14/17  7:50 PM              Lactate Dehydrogenase   Order: [id number]   Status:  Final result   Visible to patient:  No (Not Released) Dx:  Night sweat; Anorexia; Lymphocytosis;...      Ref Range & Units 3wk ago      - 214 U/L 231 (H)   Resulting Agency   [facility]      Specimen Collected: 11/14/17  3:29 PM Last Resulted: 11/14/17  4:00 PM              Sedimentation Rate   Order:  [id number]   Status:  Final result   Visible to patient:  No (Not Released) Dx:  Night sweat; Anorexia; Lymphocytosis;...      Ref Range & Units 3wk ago     Sed Rate 0 - 30 mm/hr 14   Resulting Agency   [facility]      Specimen Collected: 11/14/17  3:29 PM Last Resulted: 11/14/17  4:43 PM                Assessment/Plan  In summary I have a 57-year-old white female who is a chronic smoker who has been on disability due to chronic back pain, cervical spine pain, who has been referred to us because she has had a leukocytosis at least since 2016 and progressive lymphocytosis. During the last 2 months the patient has started to complain of pain in the left upper quadrant of her abdomen intermittently on daily basis. She has lost her appetite and she has lost 30 pounds of weight. She has nocturnal sweats and significant degree of fatigue to the point that she needs to take a nap through the day and this is not her. Her physical exam is unrevealing with the exception of palpable cervical adenopathies stated in the physical examination and persistent lymphocytosis and leukocytosis. The abdominal exam is benign, at least no palpable organomegaly or masses were documented. The patient mentions a rash in the forearms but she has none today though. She is a chronic smoker.    I discussed with her for over half an hour the reports of her radiological assessment that I personally reviewed in the PACs system at [facility] and agree with the radiologist interpretation. She has a minimal left hilar adenopathy measuring 1.7 cm in diameter with no obvious pulmonary masses or nodules and no evident inflammatory changes. Pleural surfaces, pericardium and cardiac size are normal. Liver, spleen, pancreas and kidneys are unremarkable. 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 This has no functional limitations in regard to no evidence of  hydronephrosis on the left side.     Her LDH is normal. Her sedimentation rate and C-reactive protein are normal. Her serum protein electrophoresis disclosed no evidence of monoclonal protein and most importantly her peripheral blood flow cytometry shows a polyclonal expansion of lymphocytes that are strongly associated with cigarette smoking.     Therefore, on the above premises, is not too much that I can talk about the patient in regard the lymphocytosis that is very likely associated with reactive phenomenon to chronic smoking. Also the patient and the CT scan has previous evidence of Nissen fundoplication and I would like for this patient, given the report of the radiologist of thickening in the lower esophagus, to proceed with an upper GI endoscopy. The radiologist mentioned hepatomegaly. I do not see too much of anything in my opinion and on clinical grounds this is not documented whatsoever. I see no fat infiltration in the liver neither.     Therefore, given all the above issues I advised the patient the followin. She should have a way to see smoking cessation class nurse in order to work into controlling of this and eventually getting rid of this problem.   2. I have referred her to see Dr. Cristian Sharif, Gastroenterology, in regard to consideration of upper GI endoscopy and be sure that she does not have a [place] esophagus. I do not know how much thickening of the esophagus can develop after having a Nissen fundoplication and I will guess there is some scar tissue formation after surgery that could be consequential to the patient but has no symptoms to her at all at this time.     The lymphocytosis continues and she understands the reasoning behind as I pointed out to her and provided her the report of her peripheral blood flow cytometry.     I want to monitor her left hilar adenopathy with a CT scan of the chest to be performed in 3 months along with a CBC.

## 2017-12-20 ENCOUNTER — TELEPHONE (OUTPATIENT)
Dept: GASTROENTEROLOGY | Facility: CLINIC | Age: 57
End: 2017-12-20

## 2017-12-20 ENCOUNTER — HOSPITAL ENCOUNTER (OUTPATIENT)
Facility: HOSPITAL | Age: 57
Setting detail: HOSPITAL OUTPATIENT SURGERY
End: 2017-12-20
Attending: INTERNAL MEDICINE | Admitting: INTERNAL MEDICINE

## 2017-12-20 ENCOUNTER — OFFICE VISIT (OUTPATIENT)
Dept: GASTROENTEROLOGY | Facility: CLINIC | Age: 57
End: 2017-12-20

## 2017-12-20 VITALS
HEIGHT: 65 IN | WEIGHT: 142.4 LBS | TEMPERATURE: 97.7 F | BODY MASS INDEX: 23.72 KG/M2 | SYSTOLIC BLOOD PRESSURE: 114 MMHG | DIASTOLIC BLOOD PRESSURE: 68 MMHG

## 2017-12-20 DIAGNOSIS — K63.5 POLYP OF SIGMOID COLON, UNSPECIFIED TYPE: ICD-10-CM

## 2017-12-20 DIAGNOSIS — R93.3 ABNORMAL CT SCAN, ESOPHAGUS: Primary | ICD-10-CM

## 2017-12-20 PROCEDURE — 99204 OFFICE O/P NEW MOD 45 MIN: CPT | Performed by: INTERNAL MEDICINE

## 2017-12-20 RX ORDER — SODIUM CHLORIDE, SODIUM LACTATE, POTASSIUM CHLORIDE, CALCIUM CHLORIDE 600; 310; 30; 20 MG/100ML; MG/100ML; MG/100ML; MG/100ML
30 INJECTION, SOLUTION INTRAVENOUS CONTINUOUS
Status: CANCELLED | OUTPATIENT
Start: 2017-12-28

## 2017-12-20 NOTE — TELEPHONE ENCOUNTER
----- Message from Carlos Griffiths MD sent at 12/20/2017 12:43 PM EST -----  Regarding: Colonoscopy in addition to EGD  Please let pt know that after review of records from her prior GI I think we should also perform a colonoscopy at the same time as her EGD.  It looks like the end of her colon was not completely visualized during the last colonoscopy due to spasm.  I have resubmitted case request for both EGD and colonoscopy thank you

## 2017-12-20 NOTE — PROGRESS NOTES
Chief Complaint   Patient presents with   • EGD     Abnormal CT of esophagus     Subjective   HPI     Carol Ann Bolaños is a 57 y.o. female who presents today to discuss recent abnormal CT scan of esophagus.  The patient is being followed by Dr. Russell for lymphocytosis, and part of workup underwent CT scan of C/A/P to r/o occult malignancy.  The radiologist describes some generalized thickening of the distal esophagus which was nonspecific but could represent some underlying esophagitis.  It should be noted that the patient has a history of a Nissen fundoplication performed in December of this year due to PPI resistant reflux.  She did have an EGD prior to the surgery done by Dr. Jayesh Hollins in October 2016 with no significant abnormalities noted other than a hiatal hernia. No current issues with dysphagia or heartburn.  Father with hx of oral cancer.  Colonoscopy in 2016 with 7mm sigmoid polyp and diverticulosis.  I reviewed path from that procedure and there was no mention of the sigmoid polyp in the final report.  It also appears from procedure report that distal colon was incompletely visualized due to spasm.        Past Medical History:   Diagnosis Date   • COPD (chronic obstructive pulmonary disease)    • GERD (gastroesophageal reflux disease)    • H/O Anesthesia complication     Has awakened during scope procedures   • H/O B12 deficiency    • H/O Diverticulosis    • H/O Neck pain    • H/O PONV (postoperative nausea and vomiting)     Does well with scope patch   • H/O Syncope and collapse 2013    Cardiac workup w/o cardiac disease diagnosed   • Hiatal hernia    • IBS (irritable bowel syndrome)    • Osteoporosis    • Pulmonary embolism 1986    Post hysterectomy       Current Outpatient Prescriptions:   •  acetaminophen (TYLENOL) 500 MG tablet, Take 500 mg by mouth 2 (Two) Times a Day. 2,  bid, Disp: , Rfl:   •  albuterol (PROVENTIL HFA;VENTOLIN HFA) 108 (90 Base) MCG/ACT inhaler, Inhale 2 puffs Every 4 (Four)  Hours As Needed for Wheezing., Disp: , Rfl:   •  atorvastatin (LIPITOR) 10 MG tablet, Take 10 mg by mouth Every Night., Disp: , Rfl:   •  cyanocobalamin 1000 MCG/ML injection, Inject 1,000 mcg into the shoulder, thigh, or buttocks Every 30 (Thirty) Days., Disp: , Rfl:   •  denosumab (PROLIA) 60 MG/ML solution syringe, Inject  under the skin Every 6 (Six) Months., Disp: , Rfl:   •  fluticasone (FLONASE) 50 MCG/ACT nasal spray, 2 sprays into each nostril Daily. 50mcg    2, 2x day, Disp: , Rfl:   •  furosemide (LASIX) 20 MG tablet, Take 20 mg by mouth daily., Disp: , Rfl:   •  gabapentin (NEURONTIN) 300 MG capsule, Take 600 mg by mouth 3 (Three) Times a Day., Disp: , Rfl:   •  hydrOXYzine (ATARAX) 25 MG tablet, Take 50 mg by mouth every night., Disp: , Rfl:   Allergies   Allergen Reactions   • Codeine    • Ibuprofen      Social History     Social History   • Marital status:      Spouse name: Sunday   • Number of children: 1   • Years of education: High School     Occupational History   •  Unemployed     Social History Main Topics   • Smoking status: Current Every Day Smoker     Packs/day: 0.50     Types: Cigarettes   • Smokeless tobacco: Not on file      Comment: 35 years     1/2 ppd   • Alcohol use No   • Drug use: No   • Sexual activity: Defer     Other Topics Concern   • Not on file     Social History Narrative     Family History   Problem Relation Age of Onset   • Hypertension Mother    • Heart disease Mother    • Throat cancer Father 52     Voice box   • Heart attack Father    • Heart disease Father    • Colon polyps Sister    • Colon cancer Paternal Aunt      Review of Systems   Constitutional: Positive for fatigue and unexpected weight change.   Gastrointestinal: Negative.    All other systems reviewed and are negative.    Objective   Vitals:    12/20/17 0820   BP: 114/68   Temp: 97.7 °F (36.5 °C)     Physical Exam   Constitutional: She is oriented to person, place, and time. She appears well-developed  and well-nourished.   HENT:   Head: Normocephalic and atraumatic.   Mouth/Throat: Oropharynx is clear and moist.   Eyes: EOM are normal. No scleral icterus.   Neck: Normal range of motion. Neck supple. No thyromegaly present.   Cardiovascular: Normal rate, regular rhythm and normal heart sounds.  Exam reveals no gallop and no friction rub.    No murmur heard.  Pulmonary/Chest: Effort normal and breath sounds normal. She has no wheezes. She has no rales. She exhibits no tenderness.   Abdominal: Soft. Bowel sounds are normal. She exhibits no distension. There is no tenderness. There is no rebound and no guarding. No hernia.   Musculoskeletal: Normal range of motion. She exhibits no edema.   Lymphadenopathy:     She has no cervical adenopathy.   Neurological: She is alert and oriented to person, place, and time.   Skin: Skin is warm and dry.   Psychiatric: She has a normal mood and affect. Judgment and thought content normal.   Vitals reviewed.       Assessment/Plan   Assessment:     1. Abnormal CT scan, esophagus      Plan:   Will arrange for EGD for further evaluation of the nonspecific findings on CT scan   Will arrange to repeat colonoscopy at same time given incomplete visualization of distal colon and uncertainty regarding polyp pathology.        Carlos Griffiths M.D.  [facility]  [address] [place] [address]  Office: [phone]

## 2017-12-21 PROBLEM — K63.5 POLYP OF SIGMOID COLON: Status: ACTIVE | Noted: 2017-12-21

## 2017-12-28 ENCOUNTER — ANESTHESIA (OUTPATIENT)
Dept: GASTROENTEROLOGY | Facility: HOSPITAL | Age: 57
End: 2017-12-28

## 2017-12-28 ENCOUNTER — ANESTHESIA EVENT (OUTPATIENT)
Dept: GASTROENTEROLOGY | Facility: HOSPITAL | Age: 57
End: 2017-12-28

## 2017-12-28 ENCOUNTER — HOSPITAL ENCOUNTER (OUTPATIENT)
Facility: HOSPITAL | Age: 57
Setting detail: HOSPITAL OUTPATIENT SURGERY
Discharge: HOME | End: 2017-12-28
Attending: INTERNAL MEDICINE | Admitting: INTERNAL MEDICINE

## 2017-12-28 ENCOUNTER — APPOINTMENT (OUTPATIENT)
Dept: CT IMAGING | Facility: HOSPITAL | Age: 57
End: 2017-12-28

## 2017-12-28 VITALS
WEIGHT: 140.56 LBS | RESPIRATION RATE: 20 BRPM | HEART RATE: 92 BPM | BODY MASS INDEX: 22.06 KG/M2 | SYSTOLIC BLOOD PRESSURE: 116 MMHG | OXYGEN SATURATION: 100 % | DIASTOLIC BLOOD PRESSURE: 77 MMHG | TEMPERATURE: 98.4 F | HEIGHT: 67 IN

## 2017-12-28 DIAGNOSIS — K63.5 POLYP OF SIGMOID COLON, UNSPECIFIED TYPE: ICD-10-CM

## 2017-12-28 DIAGNOSIS — R93.3 ABNORMAL CT SCAN, ESOPHAGUS: ICD-10-CM

## 2017-12-28 PROCEDURE — 88305 TISSUE EXAM BY PATHOLOGIST: CPT | Performed by: INTERNAL MEDICINE

## 2017-12-28 PROCEDURE — 45385 COLONOSCOPY W/LESION REMOVAL: CPT | Performed by: INTERNAL MEDICINE

## 2017-12-28 PROCEDURE — 43239 EGD BIOPSY SINGLE/MULTIPLE: CPT | Performed by: INTERNAL MEDICINE

## 2017-12-28 PROCEDURE — 74176 CT ABD & PELVIS W/O CONTRAST: CPT

## 2017-12-28 PROCEDURE — 88312 SPECIAL STAINS GROUP 1: CPT | Performed by: INTERNAL MEDICINE

## 2017-12-28 PROCEDURE — 25010000002 FENTANYL CITRATE (PF) 100 MCG/2ML SOLUTION: Performed by: ANESTHESIOLOGY

## 2017-12-28 PROCEDURE — 45381 COLONOSCOPY SUBMUCOUS NJX: CPT | Performed by: INTERNAL MEDICINE

## 2017-12-28 PROCEDURE — 25010000002 GLUCAGON (RDNA) PER 1 MG: Performed by: INTERNAL MEDICINE

## 2017-12-28 PROCEDURE — 25010000002 PROPOFOL 10 MG/ML EMULSION: Performed by: ANESTHESIOLOGY

## 2017-12-28 RX ORDER — FENTANYL CITRATE 50 UG/ML
25 INJECTION, SOLUTION INTRAMUSCULAR; INTRAVENOUS
Status: CANCELLED | OUTPATIENT
Start: 2017-12-28 | End: 2018-01-07

## 2017-12-28 RX ORDER — FENTANYL CITRATE 50 UG/ML
100 INJECTION, SOLUTION INTRAMUSCULAR; INTRAVENOUS
Status: DISCONTINUED | OUTPATIENT
Start: 2017-12-28 | End: 2017-12-28 | Stop reason: HOSPADM

## 2017-12-28 RX ORDER — PROPOFOL 10 MG/ML
VIAL (ML) INTRAVENOUS AS NEEDED
Status: DISCONTINUED | OUTPATIENT
Start: 2017-12-28 | End: 2017-12-28 | Stop reason: SURG

## 2017-12-28 RX ORDER — SODIUM CHLORIDE 9 MG/ML
INJECTION, SOLUTION INTRAVENOUS AS NEEDED
Status: DISCONTINUED | OUTPATIENT
Start: 2017-12-28 | End: 2017-12-28 | Stop reason: HOSPADM

## 2017-12-28 RX ORDER — SODIUM CHLORIDE, SODIUM LACTATE, POTASSIUM CHLORIDE, CALCIUM CHLORIDE 600; 310; 30; 20 MG/100ML; MG/100ML; MG/100ML; MG/100ML
30 INJECTION, SOLUTION INTRAVENOUS CONTINUOUS
Status: DISCONTINUED | OUTPATIENT
Start: 2017-12-28 | End: 2017-12-28 | Stop reason: HOSPADM

## 2017-12-28 RX ORDER — PROPOFOL 10 MG/ML
VIAL (ML) INTRAVENOUS CONTINUOUS PRN
Status: DISCONTINUED | OUTPATIENT
Start: 2017-12-28 | End: 2017-12-28 | Stop reason: SURG

## 2017-12-28 RX ORDER — LIDOCAINE HYDROCHLORIDE 20 MG/ML
INJECTION, SOLUTION INFILTRATION; PERINEURAL AS NEEDED
Status: DISCONTINUED | OUTPATIENT
Start: 2017-12-28 | End: 2017-12-28 | Stop reason: SURG

## 2017-12-28 RX ORDER — PANTOPRAZOLE SODIUM 40 MG/1
40 TABLET, DELAYED RELEASE ORAL DAILY
Qty: 30 TABLET | Refills: 5 | Status: SHIPPED | OUTPATIENT
Start: 2017-12-28

## 2017-12-28 RX ADMIN — PROPOFOL 100 MG: 10 INJECTION, EMULSION INTRAVENOUS at 12:45

## 2017-12-28 RX ADMIN — PROPOFOL 50 MG: 10 INJECTION, EMULSION INTRAVENOUS at 13:00

## 2017-12-28 RX ADMIN — FENTANYL CITRATE 25 MCG: 50 INJECTION INTRAMUSCULAR; INTRAVENOUS at 14:06

## 2017-12-28 RX ADMIN — PROPOFOL 50 MG: 10 INJECTION, EMULSION INTRAVENOUS at 13:05

## 2017-12-28 RX ADMIN — SODIUM CHLORIDE, POTASSIUM CHLORIDE, SODIUM LACTATE AND CALCIUM CHLORIDE 30 ML/HR: 600; 310; 30; 20 INJECTION, SOLUTION INTRAVENOUS at 12:05

## 2017-12-28 RX ADMIN — PROPOFOL 50 MG: 10 INJECTION, EMULSION INTRAVENOUS at 12:50

## 2017-12-28 RX ADMIN — FENTANYL CITRATE 25 MCG: 50 INJECTION INTRAMUSCULAR; INTRAVENOUS at 13:41

## 2017-12-28 RX ADMIN — FENTANYL CITRATE 25 MCG: 50 INJECTION INTRAMUSCULAR; INTRAVENOUS at 13:49

## 2017-12-28 RX ADMIN — PROPOFOL 100 MCG/KG/MIN: 10 INJECTION, EMULSION INTRAVENOUS at 12:45

## 2017-12-28 RX ADMIN — LIDOCAINE HYDROCHLORIDE 60 MG: 20 INJECTION, SOLUTION INFILTRATION; PERINEURAL at 12:45

## 2017-12-28 RX ADMIN — FENTANYL CITRATE 25 MCG: 50 INJECTION INTRAMUSCULAR; INTRAVENOUS at 13:59

## 2017-12-28 RX ADMIN — PROPOFOL 50 MG: 10 INJECTION, EMULSION INTRAVENOUS at 12:55

## 2017-12-28 RX ADMIN — PROPOFOL 50 MG: 10 INJECTION, EMULSION INTRAVENOUS at 13:10

## 2017-12-28 NOTE — ANESTHESIA PREPROCEDURE EVALUATION
Anesthesia Evaluation     Patient summary reviewed and Nursing notes reviewed   history of anesthetic complications: PONV         Airway   Mallampati: I  TM distance: <3 FB  Neck ROM: full  no difficulty expected  Dental - normal exam     Pulmonary - normal exam   (+) pulmonary embolism, COPD,   Cardiovascular - negative cardio ROS and normal exam        Neuro/Psych  (+) syncope,     GI/Hepatic/Renal/Endo    (+)  hiatal hernia, GERD,     Musculoskeletal (-) negative ROS    Abdominal  - normal exam    Bowel sounds: normal.   Substance History - negative use     OB/GYN negative ob/gyn ROS         Other                                                Anesthesia Plan    ASA 3     MAC     Anesthetic plan and risks discussed with patient.

## 2017-12-28 NOTE — ANESTHESIA POSTPROCEDURE EVALUATION
"Patient: Carol Ann Bolaños    Procedure Summary     Date Anesthesia Start Anesthesia Stop Room / Location    12/28/17 1244 1326  [facility] /  [facility]       Procedure Diagnosis Surgeon Provider    ESOPHAGOGASTRODUODENOSCOPY WITH BIOPSY (N/A Esophagus); COLONOSCOPY INTO CECUM WITH COLD AND HOT SNARE POLYPECTOMIES WITH 2.5 CC SALINE LIFT INJECTION (N/A ) Abnormal CT scan, esophagus; Polyp of sigmoid colon, unspecified type  (Abnormal CT scan, esophagus [R93.3]; Polyp of sigmoid colon, unspecified type [D12.5]) MD Naga Pa MD          Anesthesia Type: MAC  Last vitals  BP   116/77 (12/28/17 1407)   Temp   36.9 °C (98.4 °F) (12/28/17 1151)   Pulse   92 (12/28/17 1346)   Resp   20 (12/28/17 1346)     SpO2   100 % (12/28/17 1407)     Post Anesthesia Care and Evaluation    Patient location during evaluation: bedside  Patient participation: complete - patient participated  Level of consciousness: awake  Pain score: 0  Pain management: adequate  Airway patency: patent  Anesthetic complications: No anesthetic complications    Cardiovascular status: acceptable  Respiratory status: acceptable  Hydration status: acceptable    Comments: Blood pressure 116/77, pulse 92, temperature 36.9 °C (98.4 °F), temperature source Oral, resp. rate 20, height 170.2 cm (67\"), weight 63.8 kg (140 lb 9 oz), SpO2 100 %.    No anesthesia care post op    "

## 2017-12-29 LAB
CYTO UR: NORMAL
LAB AP CASE REPORT: NORMAL
Lab: NORMAL
PATH REPORT.FINAL DX SPEC: NORMAL
PATH REPORT.GROSS SPEC: NORMAL

## 2018-01-04 ENCOUNTER — TELEPHONE (OUTPATIENT)
Dept: OTHER | Facility: HOSPITAL | Age: 58
End: 2018-01-04

## 2018-01-08 ENCOUNTER — HOSPITAL ENCOUNTER (OUTPATIENT)
Dept: INFUSION THERAPY | Facility: HOSPITAL | Age: 58
Discharge: HOME | End: 2018-01-08
Admitting: INTERNAL MEDICINE

## 2018-01-08 VITALS
OXYGEN SATURATION: 100 % | DIASTOLIC BLOOD PRESSURE: 74 MMHG | TEMPERATURE: 97.1 F | SYSTOLIC BLOOD PRESSURE: 110 MMHG | RESPIRATION RATE: 16 BRPM | HEART RATE: 112 BPM

## 2018-01-08 DIAGNOSIS — M81.0 SENILE OSTEOPOROSIS: ICD-10-CM

## 2018-01-08 PROCEDURE — 96372 THER/PROPH/DIAG INJ SC/IM: CPT

## 2018-01-08 PROCEDURE — 25010000002 DENOSUMAB 60 MG/ML SOLUTION: Performed by: INTERNAL MEDICINE

## 2018-01-08 RX ADMIN — DENOSUMAB 60 MG: 60 INJECTION SUBCUTANEOUS at 11:38

## 2018-01-08 NOTE — PROGRESS NOTES
Dr. Pallares office called for CMP results and calcium level noted wnl for Prolia injection -given- tolerated well. Discharged home amb after appointment completed.

## 2018-01-08 NOTE — PROGRESS NOTES
The polyp(s) biopsies showed adenomatous change. This is not cancerous but is considered potentially precancerous. Follow-up colonoscopy in 3 years is advised.   EGD biopsies with no concerning findings.

## 2018-01-11 ENCOUNTER — TELEPHONE (OUTPATIENT)
Dept: GASTROENTEROLOGY | Facility: CLINIC | Age: 58
End: 2018-01-11

## 2018-01-25 NOTE — TELEPHONE ENCOUNTER
Attempted to call patient x3 regarding referral from Dr Russell for smoking cessation counseling. Left VM again today and my call back information. Thank you for the referral.

## 2020-06-17 ENCOUNTER — HOSPITAL ENCOUNTER (OUTPATIENT)
Dept: GENERAL RADIOLOGY | Facility: HOSPITAL | Age: 60
Discharge: HOME | End: 2020-06-17
Admitting: INTERNAL MEDICINE

## 2020-06-17 ENCOUNTER — TRANSCRIBE ORDERS (OUTPATIENT)
Dept: ADMINISTRATIVE | Facility: HOSPITAL | Age: 60
End: 2020-06-17

## 2020-06-17 ENCOUNTER — HOSPITAL ENCOUNTER (OUTPATIENT)
Dept: GENERAL RADIOLOGY | Facility: HOSPITAL | Age: 60
Discharge: HOME | End: 2020-06-17

## 2020-06-17 DIAGNOSIS — R52 PAIN: ICD-10-CM

## 2020-06-17 DIAGNOSIS — Z12.31 VISIT FOR SCREENING MAMMOGRAM: Primary | ICD-10-CM

## 2020-06-17 DIAGNOSIS — Z78.0 POST-MENOPAUSAL: ICD-10-CM

## 2020-06-17 PROCEDURE — 73630 X-RAY EXAM OF FOOT: CPT

## 2020-06-17 PROCEDURE — 73610 X-RAY EXAM OF ANKLE: CPT

## 2020-08-18 ENCOUNTER — HOSPITAL ENCOUNTER (OUTPATIENT)
Dept: BONE DENSITY | Facility: HOSPITAL | Age: 60
Discharge: HOME | End: 2020-08-18

## 2020-08-18 ENCOUNTER — HOSPITAL ENCOUNTER (OUTPATIENT)
Dept: MAMMOGRAPHY | Facility: HOSPITAL | Age: 60
Discharge: HOME | End: 2020-08-18
Admitting: INTERNAL MEDICINE

## 2020-08-18 DIAGNOSIS — Z12.31 VISIT FOR SCREENING MAMMOGRAM: ICD-10-CM

## 2020-08-18 DIAGNOSIS — Z78.0 POST-MENOPAUSAL: ICD-10-CM

## 2020-08-18 PROCEDURE — 77067 SCR MAMMO BI INCL CAD: CPT

## 2020-08-18 PROCEDURE — 77080 DXA BONE DENSITY AXIAL: CPT

## 2020-08-18 PROCEDURE — 77063 BREAST TOMOSYNTHESIS BI: CPT

## 2020-12-23 ENCOUNTER — TRANSCRIBE ORDERS (OUTPATIENT)
Dept: ADMINISTRATIVE | Facility: HOSPITAL | Age: 60
End: 2020-12-23

## 2020-12-23 DIAGNOSIS — R10.32 ABDOMINAL PAIN, LEFT LOWER QUADRANT: Primary | ICD-10-CM

## 2020-12-24 ENCOUNTER — APPOINTMENT (OUTPATIENT)
Dept: CT IMAGING | Facility: HOSPITAL | Age: 60
End: 2020-12-24

## 2020-12-28 ENCOUNTER — HOSPITAL ENCOUNTER (OUTPATIENT)
Dept: CT IMAGING | Facility: HOSPITAL | Age: 60
Discharge: HOME | End: 2020-12-28
Admitting: INTERNAL MEDICINE

## 2020-12-28 DIAGNOSIS — R10.32 ABDOMINAL PAIN, LEFT LOWER QUADRANT: ICD-10-CM

## 2020-12-28 LAB — CREAT BLDA-MCNC: 0.8 MG/DL (ref 0.6–1.3)

## 2020-12-28 PROCEDURE — 25010000002 IOPAMIDOL 61 % SOLUTION: Performed by: INTERNAL MEDICINE

## 2020-12-28 PROCEDURE — 82565 ASSAY OF CREATININE: CPT

## 2020-12-28 PROCEDURE — 74177 CT ABD & PELVIS W/CONTRAST: CPT

## 2020-12-28 RX ADMIN — IOPAMIDOL 85 ML: 612 INJECTION, SOLUTION INTRAVENOUS at 11:34

## (undated) DEVICE — TUBING, SUCTION, 1/4" X 10', STRAIGHT: Brand: MEDLINE

## (undated) DEVICE — THE TORRENT IRRIGATION SCOPE CONNECTOR IS USED WITH THE TORRENT IRRIGATION TUBING TO PROVIDE IRRIGATION FLUIDS SUCH AS STERILE WATER DURING GASTROINTESTINAL ENDOSCOPIC PROCEDURES WHEN USED IN CONJUNCTION WITH AN IRRIGATION PUMP (OR ELECTROSURGICAL UNIT).: Brand: TORRENT

## (undated) DEVICE — FRCP BX RADJAW4 NDL 2.8 240CM LG OG BX40

## (undated) DEVICE — CANN NASL CO2 TRULINK W/O2 A/

## (undated) DEVICE — BITEBLOCK OMNI BLOC

## (undated) DEVICE — Device: Brand: DEFENDO AIR/WATER/SUCTION AND BIOPSY VALVE

## (undated) DEVICE — TBG 02 CRUSH RESIST LF CLR 7FT